# Patient Record
Sex: MALE | Race: WHITE | Employment: UNEMPLOYED | URBAN - METROPOLITAN AREA
[De-identification: names, ages, dates, MRNs, and addresses within clinical notes are randomized per-mention and may not be internally consistent; named-entity substitution may affect disease eponyms.]

---

## 2020-10-17 ENCOUNTER — APPOINTMENT (OUTPATIENT)
Dept: CT IMAGING | Age: 60
DRG: 720 | End: 2020-10-17
Payer: MEDICAID

## 2020-10-17 ENCOUNTER — APPOINTMENT (OUTPATIENT)
Dept: GENERAL RADIOLOGY | Age: 60
DRG: 720 | End: 2020-10-17
Payer: MEDICAID

## 2020-10-17 ENCOUNTER — HOSPITAL ENCOUNTER (INPATIENT)
Age: 60
LOS: 4 days | Discharge: HOME | DRG: 720 | End: 2020-10-21
Attending: EMERGENCY MEDICINE | Admitting: INTERNAL MEDICINE
Payer: MEDICAID

## 2020-10-17 PROBLEM — J18.9 MULTIFOCAL PNEUMONIA: Status: ACTIVE | Noted: 2020-10-17

## 2020-10-17 LAB
A/G RATIO: 0.9 (ref 1.1–2.2)
ABO/RH: NORMAL
ACETAMINOPHEN LEVEL: <5 UG/ML (ref 10–30)
ALBUMIN SERPL-MCNC: 3.8 G/DL (ref 3.4–5)
ALP BLD-CCNC: 88 U/L (ref 40–129)
ALT SERPL-CCNC: 21 U/L (ref 10–40)
AMPHETAMINE SCREEN, URINE: NORMAL
ANION GAP SERPL CALCULATED.3IONS-SCNC: 14 MMOL/L (ref 3–16)
ANTIBODY SCREEN: NORMAL
APTT: 26.2 SEC (ref 24.2–36.2)
AST SERPL-CCNC: 27 U/L (ref 15–37)
BARBITURATE SCREEN URINE: NORMAL
BASOPHILS ABSOLUTE: 0 K/UL (ref 0–0.2)
BASOPHILS RELATIVE PERCENT: 0.3 %
BENZODIAZEPINE SCREEN, URINE: NORMAL
BILIRUB SERPL-MCNC: 1.2 MG/DL (ref 0–1)
BILIRUBIN URINE: ABNORMAL
BLOOD, URINE: NEGATIVE
BUN BLDV-MCNC: 30 MG/DL (ref 7–20)
CALCIUM SERPL-MCNC: 9.2 MG/DL (ref 8.3–10.6)
CANNABINOID SCREEN URINE: NORMAL
CHLORIDE BLD-SCNC: 106 MMOL/L (ref 99–110)
CLARITY: ABNORMAL
CO2: 28 MMOL/L (ref 21–32)
COCAINE METABOLITE SCREEN URINE: NORMAL
COLOR: ABNORMAL
CREAT SERPL-MCNC: 1 MG/DL (ref 0.8–1.3)
EOSINOPHILS ABSOLUTE: 0 K/UL (ref 0–0.6)
EOSINOPHILS RELATIVE PERCENT: 0.4 %
EPITHELIAL CELLS, UA: 3 /HPF (ref 0–5)
ETHANOL: NORMAL MG/DL (ref 0–0.08)
GFR AFRICAN AMERICAN: >60
GFR NON-AFRICAN AMERICAN: >60
GLOBULIN: 4.2 G/DL
GLUCOSE BLD-MCNC: 137 MG/DL (ref 70–99)
GLUCOSE URINE: NEGATIVE MG/DL
HCT VFR BLD CALC: 45.7 % (ref 40.5–52.5)
HEMOGLOBIN: 15 G/DL (ref 13.5–17.5)
HYALINE CASTS: 6 /LPF (ref 0–8)
INR BLD: 1.16 (ref 0.86–1.14)
KETONES, URINE: ABNORMAL MG/DL
LACTATE DEHYDROGENASE: 431 U/L (ref 100–190)
LACTIC ACID, SEPSIS: 1.4 MMOL/L (ref 0.4–1.9)
LACTIC ACID, SEPSIS: 2.1 MMOL/L (ref 0.4–1.9)
LEUKOCYTE ESTERASE, URINE: ABNORMAL
LIPASE: 147 U/L (ref 13–60)
LYMPHOCYTES ABSOLUTE: 1.1 K/UL (ref 1–5.1)
LYMPHOCYTES RELATIVE PERCENT: 9.4 %
Lab: NORMAL
MCH RBC QN AUTO: 32 PG (ref 26–34)
MCHC RBC AUTO-ENTMCNC: 32.9 G/DL (ref 31–36)
MCV RBC AUTO: 97.2 FL (ref 80–100)
METHADONE SCREEN, URINE: NORMAL
MICROSCOPIC EXAMINATION: YES
MONOCYTES ABSOLUTE: 1.1 K/UL (ref 0–1.3)
MONOCYTES RELATIVE PERCENT: 9.2 %
NEUTROPHILS ABSOLUTE: 9.6 K/UL (ref 1.7–7.7)
NEUTROPHILS RELATIVE PERCENT: 80.7 %
NITRITE, URINE: NEGATIVE
OPIATE SCREEN URINE: NORMAL
OXYCODONE URINE: NORMAL
PDW BLD-RTO: 14 % (ref 12.4–15.4)
PH UA: 5
PH UA: 5 (ref 5–8)
PHENCYCLIDINE SCREEN URINE: NORMAL
PLATELET # BLD: 260 K/UL (ref 135–450)
PMV BLD AUTO: 10.4 FL (ref 5–10.5)
POTASSIUM SERPL-SCNC: 3.9 MMOL/L (ref 3.5–5.1)
PRO-BNP: 93 PG/ML (ref 0–124)
PROCALCITONIN: 0.49 NG/ML (ref 0–0.15)
PROPOXYPHENE SCREEN: NORMAL
PROTEIN UA: ABNORMAL MG/DL
PROTHROMBIN TIME: 13.5 SEC (ref 10–13.2)
RAPID INFLUENZA  B AGN: NEGATIVE
RAPID INFLUENZA A AGN: NEGATIVE
RBC # BLD: 4.7 M/UL (ref 4.2–5.9)
RBC UA: NORMAL /HPF (ref 0–4)
SALICYLATE, SERUM: <0.3 MG/DL (ref 15–30)
SODIUM BLD-SCNC: 148 MMOL/L (ref 136–145)
SPECIFIC GRAVITY UA: 1.03 (ref 1–1.03)
TOTAL CK: 306 U/L (ref 39–308)
TOTAL PROTEIN: 8 G/DL (ref 6.4–8.2)
TROPONIN: <0.01 NG/ML
URINE REFLEX TO CULTURE: ABNORMAL
URINE TYPE: ABNORMAL
UROBILINOGEN, URINE: 2 E.U./DL
WBC # BLD: 11.8 K/UL (ref 4–11)
WBC UA: 4 /HPF (ref 0–5)

## 2020-10-17 PROCEDURE — 99285 EMERGENCY DEPT VISIT HI MDM: CPT

## 2020-10-17 PROCEDURE — 6360000002 HC RX W HCPCS: Performed by: PHYSICIAN ASSISTANT

## 2020-10-17 PROCEDURE — 87804 INFLUENZA ASSAY W/OPTIC: CPT

## 2020-10-17 PROCEDURE — 2580000003 HC RX 258: Performed by: PHYSICIAN ASSISTANT

## 2020-10-17 PROCEDURE — 72125 CT NECK SPINE W/O DYE: CPT

## 2020-10-17 PROCEDURE — G0480 DRUG TEST DEF 1-7 CLASSES: HCPCS

## 2020-10-17 PROCEDURE — 80053 COMPREHEN METABOLIC PANEL: CPT

## 2020-10-17 PROCEDURE — 93005 ELECTROCARDIOGRAM TRACING: CPT | Performed by: EMERGENCY MEDICINE

## 2020-10-17 PROCEDURE — 86900 BLOOD TYPING SEROLOGIC ABO: CPT

## 2020-10-17 PROCEDURE — 74177 CT ABD & PELVIS W/CONTRAST: CPT

## 2020-10-17 PROCEDURE — 83880 ASSAY OF NATRIURETIC PEPTIDE: CPT

## 2020-10-17 PROCEDURE — 83690 ASSAY OF LIPASE: CPT

## 2020-10-17 PROCEDURE — 85025 COMPLETE CBC W/AUTO DIFF WBC: CPT

## 2020-10-17 PROCEDURE — 71260 CT THORAX DX C+: CPT

## 2020-10-17 PROCEDURE — 71045 X-RAY EXAM CHEST 1 VIEW: CPT

## 2020-10-17 PROCEDURE — 36415 COLL VENOUS BLD VENIPUNCTURE: CPT

## 2020-10-17 PROCEDURE — 96375 TX/PRO/DX INJ NEW DRUG ADDON: CPT

## 2020-10-17 PROCEDURE — 85610 PROTHROMBIN TIME: CPT

## 2020-10-17 PROCEDURE — 82550 ASSAY OF CK (CPK): CPT

## 2020-10-17 PROCEDURE — 83615 LACTATE (LD) (LDH) ENZYME: CPT

## 2020-10-17 PROCEDURE — 6360000004 HC RX CONTRAST MEDICATION: Performed by: PHYSICIAN ASSISTANT

## 2020-10-17 PROCEDURE — 81001 URINALYSIS AUTO W/SCOPE: CPT

## 2020-10-17 PROCEDURE — 83605 ASSAY OF LACTIC ACID: CPT

## 2020-10-17 PROCEDURE — 85730 THROMBOPLASTIN TIME PARTIAL: CPT

## 2020-10-17 PROCEDURE — 86850 RBC ANTIBODY SCREEN: CPT

## 2020-10-17 PROCEDURE — 80307 DRUG TEST PRSMV CHEM ANLYZR: CPT

## 2020-10-17 PROCEDURE — 87040 BLOOD CULTURE FOR BACTERIA: CPT

## 2020-10-17 PROCEDURE — 84484 ASSAY OF TROPONIN QUANT: CPT

## 2020-10-17 PROCEDURE — 70450 CT HEAD/BRAIN W/O DYE: CPT

## 2020-10-17 PROCEDURE — 82728 ASSAY OF FERRITIN: CPT

## 2020-10-17 PROCEDURE — 1200000000 HC SEMI PRIVATE

## 2020-10-17 PROCEDURE — 86140 C-REACTIVE PROTEIN: CPT

## 2020-10-17 PROCEDURE — 84145 PROCALCITONIN (PCT): CPT

## 2020-10-17 PROCEDURE — U0003 INFECTIOUS AGENT DETECTION BY NUCLEIC ACID (DNA OR RNA); SEVERE ACUTE RESPIRATORY SYNDROME CORONAVIRUS 2 (SARS-COV-2) (CORONAVIRUS DISEASE [COVID-19]), AMPLIFIED PROBE TECHNIQUE, MAKING USE OF HIGH THROUGHPUT TECHNOLOGIES AS DESCRIBED BY CMS-2020-01-R: HCPCS

## 2020-10-17 PROCEDURE — 96365 THER/PROPH/DIAG IV INF INIT: CPT

## 2020-10-17 PROCEDURE — 86901 BLOOD TYPING SEROLOGIC RH(D): CPT

## 2020-10-17 PROCEDURE — U0002 COVID-19 LAB TEST NON-CDC: HCPCS

## 2020-10-17 RX ORDER — LORAZEPAM 2 MG/ML
1 INJECTION INTRAMUSCULAR ONCE
Status: COMPLETED | OUTPATIENT
Start: 2020-10-17 | End: 2020-10-17

## 2020-10-17 RX ORDER — 0.9 % SODIUM CHLORIDE 0.9 %
1000 INTRAVENOUS SOLUTION INTRAVENOUS ONCE
Status: COMPLETED | OUTPATIENT
Start: 2020-10-17 | End: 2020-10-17

## 2020-10-17 RX ORDER — 0.9 % SODIUM CHLORIDE 0.9 %
30 INTRAVENOUS SOLUTION INTRAVENOUS ONCE
Status: COMPLETED | OUTPATIENT
Start: 2020-10-17 | End: 2020-10-17

## 2020-10-17 RX ADMIN — Medication 1 G: at 17:36

## 2020-10-17 RX ADMIN — AZITHROMYCIN MONOHYDRATE 500 MG: 500 INJECTION, POWDER, LYOPHILIZED, FOR SOLUTION INTRAVENOUS at 17:39

## 2020-10-17 RX ADMIN — LORAZEPAM 1 MG: 2 INJECTION INTRAMUSCULAR; INTRAVENOUS at 17:40

## 2020-10-17 RX ADMIN — IOPAMIDOL 75 ML: 755 INJECTION, SOLUTION INTRAVENOUS at 16:09

## 2020-10-17 RX ADMIN — SODIUM CHLORIDE 2535 ML: 9 INJECTION, SOLUTION INTRAVENOUS at 17:36

## 2020-10-17 RX ADMIN — SODIUM CHLORIDE 1000 ML: 9 INJECTION, SOLUTION INTRAVENOUS at 14:59

## 2020-10-17 ASSESSMENT — ENCOUNTER SYMPTOMS
NAUSEA: 0
RHINORRHEA: 0
ABDOMINAL PAIN: 0
VOMITING: 0
DIARRHEA: 0
COUGH: 1
SHORTNESS OF BREATH: 0

## 2020-10-17 NOTE — ED NOTES
RN applied mepilex to left heal and right stump, left medial foot + skin tear, mepilex border applied, food order per pt request and water given per pt request     Thang Ireland RN  10/17/20 1528
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
## 2020-10-18 LAB
C-REACTIVE PROTEIN: 47.3 MG/L (ref 0–5.1)
D DIMER: 847 NG/ML DDU (ref 0–229)
EKG ATRIAL RATE: 96 BPM
EKG DIAGNOSIS: NORMAL
EKG P AXIS: 53 DEGREES
EKG P-R INTERVAL: 124 MS
EKG Q-T INTERVAL: 378 MS
EKG QRS DURATION: 78 MS
EKG QTC CALCULATION (BAZETT): 477 MS
EKG R AXIS: -43 DEGREES
EKG T AXIS: 38 DEGREES
EKG VENTRICULAR RATE: 96 BPM
FERRITIN: 441.2 NG/ML (ref 30–400)

## 2020-10-18 PROCEDURE — 93010 ELECTROCARDIOGRAM REPORT: CPT | Performed by: INTERNAL MEDICINE

## 2020-10-18 PROCEDURE — 87449 NOS EACH ORGANISM AG IA: CPT

## 2020-10-18 PROCEDURE — 85379 FIBRIN DEGRADATION QUANT: CPT

## 2020-10-18 PROCEDURE — 1200000000 HC SEMI PRIVATE

## 2020-10-18 PROCEDURE — 6370000000 HC RX 637 (ALT 250 FOR IP): Performed by: INTERNAL MEDICINE

## 2020-10-18 PROCEDURE — 2580000003 HC RX 258: Performed by: INTERNAL MEDICINE

## 2020-10-18 PROCEDURE — 6360000002 HC RX W HCPCS: Performed by: INTERNAL MEDICINE

## 2020-10-18 PROCEDURE — 87324 CLOSTRIDIUM AG IA: CPT

## 2020-10-18 PROCEDURE — 51702 INSERT TEMP BLADDER CATH: CPT

## 2020-10-18 RX ORDER — ACETAMINOPHEN 650 MG/1
650 SUPPOSITORY RECTAL EVERY 6 HOURS PRN
Status: DISCONTINUED | OUTPATIENT
Start: 2020-10-18 | End: 2020-10-21 | Stop reason: HOSPADM

## 2020-10-18 RX ORDER — DEXAMETHASONE SODIUM PHOSPHATE 10 MG/ML
6 INJECTION, SOLUTION INTRAMUSCULAR; INTRAVENOUS DAILY
Status: DISCONTINUED | OUTPATIENT
Start: 2020-10-18 | End: 2020-10-19

## 2020-10-18 RX ORDER — MAGNESIUM SULFATE IN WATER 40 MG/ML
2 INJECTION, SOLUTION INTRAVENOUS PRN
Status: DISCONTINUED | OUTPATIENT
Start: 2020-10-18 | End: 2020-10-21 | Stop reason: HOSPADM

## 2020-10-18 RX ORDER — ONDANSETRON 2 MG/ML
4 INJECTION INTRAMUSCULAR; INTRAVENOUS EVERY 6 HOURS PRN
Status: DISCONTINUED | OUTPATIENT
Start: 2020-10-18 | End: 2020-10-21 | Stop reason: HOSPADM

## 2020-10-18 RX ORDER — POTASSIUM CHLORIDE 7.45 MG/ML
10 INJECTION INTRAVENOUS PRN
Status: DISCONTINUED | OUTPATIENT
Start: 2020-10-18 | End: 2020-10-21 | Stop reason: HOSPADM

## 2020-10-18 RX ORDER — PROMETHAZINE HYDROCHLORIDE 25 MG/1
12.5 TABLET ORAL EVERY 6 HOURS PRN
Status: DISCONTINUED | OUTPATIENT
Start: 2020-10-18 | End: 2020-10-21 | Stop reason: HOSPADM

## 2020-10-18 RX ORDER — ACETAMINOPHEN 325 MG/1
650 TABLET ORAL EVERY 6 HOURS PRN
Status: DISCONTINUED | OUTPATIENT
Start: 2020-10-18 | End: 2020-10-21 | Stop reason: HOSPADM

## 2020-10-18 RX ORDER — SODIUM CHLORIDE 0.9 % (FLUSH) 0.9 %
10 SYRINGE (ML) INJECTION PRN
Status: DISCONTINUED | OUTPATIENT
Start: 2020-10-18 | End: 2020-10-21 | Stop reason: HOSPADM

## 2020-10-18 RX ORDER — SODIUM CHLORIDE 0.9 % (FLUSH) 0.9 %
10 SYRINGE (ML) INJECTION EVERY 12 HOURS SCHEDULED
Status: DISCONTINUED | OUTPATIENT
Start: 2020-10-18 | End: 2020-10-21 | Stop reason: HOSPADM

## 2020-10-18 RX ADMIN — AZITHROMYCIN MONOHYDRATE 500 MG: 500 INJECTION, POWDER, LYOPHILIZED, FOR SOLUTION INTRAVENOUS at 17:37

## 2020-10-18 RX ADMIN — ENOXAPARIN SODIUM 40 MG: 40 INJECTION SUBCUTANEOUS at 10:03

## 2020-10-18 RX ADMIN — DEXAMETHASONE SODIUM PHOSPHATE 6 MG: 10 INJECTION, SOLUTION INTRAMUSCULAR; INTRAVENOUS at 10:03

## 2020-10-18 RX ADMIN — Medication 10 ML: at 22:35

## 2020-10-18 RX ADMIN — Medication 1 G: at 17:37

## 2020-10-18 RX ADMIN — ACETAMINOPHEN 650 MG: 325 TABLET ORAL at 10:12

## 2020-10-18 RX ADMIN — Medication 10 ML: at 10:03

## 2020-10-18 ASSESSMENT — PAIN SCALES - GENERAL
PAINLEVEL_OUTOF10: 0
PAINLEVEL_OUTOF10: 3
PAINLEVEL_OUTOF10: 0
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
## 2020-10-18 NOTE — PROGRESS NOTES
Assessment complete. Oriented x4. Denies pain. Respirations regular and unlabored. Breath sounds diminished. On 1 L O2. NSR on tele. Right BKA. Wounds to buttocks and left foot. Estes in place. Patient very lethargic. Unable to answer admission questions at this time. Did tell RN that he doesn't take any medications at home. Patient encouraged to use call light with any needs. Patient states understanding, call light in reach, bed alarm on.

## 2020-10-18 NOTE — ED PROVIDER NOTES
I independently performed a history and physical on Palmer Epley. All diagnostic, treatment, and disposition decisions were made by myself in conjunction with the advanced practice provider. I have participated in the medical decision making and directed the treatment plan and disposition of the patient. For further details of [facility] emergency department encounter, please see the advanced practice provider's documentation. CHIEF COMPLAINT  Chief Complaint   Patient presents with    Fatigue     IN VIA FF MEDIC FROM HOME. NEIGHBORS CALLED AFTER PT HAD A FALL OUTSIDE OF HIS TRAILER/HOME. SICK FOV OVER A WEEK. PT'S RIGHT LEG IS A PROSTHETIC. LLE COOL/CLAMMY     Briefly, Palmer Epley is a 56 y.o. male  who presents to the ED complaining of fall, pt is disheveled and poor historian and not providing much history. Has a remote history of RLE BKA. He has cough, malaise, fatigue. Has denies CP or diarrhea or vomiting or abdominal pains to me. He said not SOB to PA but admits some SOB with exertion to me. C/o malaise. FOCUSED PHYSICAL EXAMINATION  BP (!) 110/59   Pulse 80   Temp 97.7 °F (36.5 °C) (Oral)   Resp 21   Ht 6' 3\" (1.905 m)   Wt 234 lb (106.1 kg)   SpO2 93%   BMI 29.25 kg/m²    Focused physical examination notable for disheveled appearance, RLE BKA noted. Rhonchi throughout, no resp distress though, RRR, abd soft NTND. Dry MM.     The 12 lead EKG was interpreted by me as follows:  Rate: normal with a rate of 96  Rhythm: sinus  Axis: left deviation  Intervals: normal IL, narrow QRS, normal QTc  ST segments: no ST elevations or depressions  T waves: no abnormal inversions  Non-specific T wave changes: not present  Prior EKG comparison: No prior is currently available for comparison    MDM:  Diagnostic considerations included acute coronary syndrome, pulmonary embolism, COPD/asthma, pneumonia, sepsis, pericardial tamponade, pneumothorax, CHF, thoracic aortic dissection, anxiety    ED course was notable for concern for sepsis with pneumonia, multifocal.  COVID pending. Patient was given community-acquired coverage antibiotics and will be admitted for ongoing management and evaluation. During every aspect of this patient encounter, full droplet plus PPE precautions were used by myself. During the patient's ED course, the patient was given:  Medications   0.9 % sodium chloride bolus (0 mLs Intravenous Stopped 10/17/20 1725)   LORazepam (ATIVAN) injection 1 mg (1 mg Intravenous Given 10/17/20 1740)   iopamidol (ISOVUE-370) 76 % injection 75 mL (75 mLs Intravenous Given 10/17/20 1609)   0.9 % sodium chloride bolus (2,535 mLs Intravenous New Bag 10/17/20 1736)   cefTRIAXone (ROCEPHIN) 1 g in sterile water 10 mL IV syringe (1 g Intravenous Given 10/17/20 1736)   azithromycin (ZITHROMAX) 500 mg in D5W 250ml Vial Mate (500 mg Intravenous New Bag 10/17/20 1739)        CLINICAL IMPRESSION  1. Septicemia ([address]    2. Pneumonia due to organism    3. Below knee amputation ([address]    4. Hypernatremia    5. Serum lipase elevation        DISPOSITION  Robb Hussein was admitted in fair condition. The plan is to admit to the hospital at this time under the hospitalist service. Dr. Freedom Pickens accepted the patient and will take over the patient's care. The total critical care time spent while evaluating and treating this patient was at least 34 minutes. This excludes time spent doing separately billable procedures. This includes time at the bedside, data interpretation, medication management, obtaining critical history from collateral sources if the patient is unable to provide it directly, and physician consultation. Specifics of interventions taken and potentially life-threatening diagnostic considerations are listed above in the medical decision making. This chart was created using Dragon dictation software.   Efforts were made by me to ensure accuracy, however some errors may be present due to limitations of this technology.             Digna Anne MD  10/17/20 [id number]

## 2020-10-18 NOTE — PROGRESS NOTES
Assessment completed. See flow chart, no medication ordered at this time. Pt in bed resting with eyes closed. Pt put on 1L of O2 for comfort. All needs have been met. Patient encouraged to use call light with any needs. Patient states understanding, call light in reach, bed alarm on. Will continue to monitor.
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
## 2020-10-18 NOTE — PROGRESS NOTES
H/p dictation id [id number]. Date of service 10/18/20. Bilateral atypical pneumonia. covid pending. Tobacco dependence.

## 2020-10-18 NOTE — PLAN OF CARE
Problem: Falls - Risk of:  Goal: Will remain free from falls  Description: Will remain free from falls  Outcome: Ongoing  Note: High fall risk. Patient remains free from falls. Patient encouraged to use call light with any needs. Patient states understanding, call light in reach, bed alarm on. Problem: Skin Integrity:  Goal: Absence of new skin breakdown  Description: Absence of new skin breakdown  Outcome: Ongoing  Note: Multiple wounds to buttocks and left foot. Dressings in place. Frequent repositioning. Will continue to monitor. Problem: Gas Exchange - Impaired:  Goal: Levels of oxygenation will improve  Description: Levels of oxygenation will improve  Outcome: Ongoing  Note: On 1 L O2. O2 saturation remains >90%. Will wean as able.

## 2020-10-19 LAB
ANION GAP SERPL CALCULATED.3IONS-SCNC: 10 MMOL/L (ref 3–16)
BUN BLDV-MCNC: 13 MG/DL (ref 7–20)
C DIFF TOXIN/ANTIGEN: NORMAL
CALCIUM SERPL-MCNC: 8.3 MG/DL (ref 8.3–10.6)
CHLORIDE BLD-SCNC: 106 MMOL/L (ref 99–110)
CO2: 24 MMOL/L (ref 21–32)
CREAT SERPL-MCNC: 0.8 MG/DL (ref 0.8–1.3)
GFR AFRICAN AMERICAN: >60
GFR NON-AFRICAN AMERICAN: >60
GLUCOSE BLD-MCNC: 183 MG/DL (ref 70–99)
HCT VFR BLD CALC: 40.7 % (ref 40.5–52.5)
HEMOGLOBIN: 13.5 G/DL (ref 13.5–17.5)
MCH RBC QN AUTO: 32.5 PG (ref 26–34)
MCHC RBC AUTO-ENTMCNC: 33.2 G/DL (ref 31–36)
MCV RBC AUTO: 97.8 FL (ref 80–100)
PDW BLD-RTO: 14 % (ref 12.4–15.4)
PLATELET # BLD: 259 K/UL (ref 135–450)
PMV BLD AUTO: 9.4 FL (ref 5–10.5)
POTASSIUM SERPL-SCNC: 4 MMOL/L (ref 3.5–5.1)
RBC # BLD: 4.16 M/UL (ref 4.2–5.9)
SARS-COV-2, PCR: NOT DETECTED
SODIUM BLD-SCNC: 140 MMOL/L (ref 136–145)
WBC # BLD: 6.6 K/UL (ref 4–11)

## 2020-10-19 PROCEDURE — 2580000003 HC RX 258: Performed by: INTERNAL MEDICINE

## 2020-10-19 PROCEDURE — 85027 COMPLETE CBC AUTOMATED: CPT

## 2020-10-19 PROCEDURE — 6360000002 HC RX W HCPCS: Performed by: INTERNAL MEDICINE

## 2020-10-19 PROCEDURE — 80048 BASIC METABOLIC PNL TOTAL CA: CPT

## 2020-10-19 PROCEDURE — 1200000000 HC SEMI PRIVATE

## 2020-10-19 PROCEDURE — 36415 COLL VENOUS BLD VENIPUNCTURE: CPT

## 2020-10-19 RX ADMIN — Medication 1 G: at 17:59

## 2020-10-19 RX ADMIN — Medication 10 ML: at 09:15

## 2020-10-19 RX ADMIN — Medication 10 ML: at 21:00

## 2020-10-19 RX ADMIN — AZITHROMYCIN MONOHYDRATE 500 MG: 500 INJECTION, POWDER, LYOPHILIZED, FOR SOLUTION INTRAVENOUS at 17:59

## 2020-10-19 RX ADMIN — DEXAMETHASONE SODIUM PHOSPHATE 6 MG: 10 INJECTION, SOLUTION INTRAMUSCULAR; INTRAVENOUS at 09:14

## 2020-10-19 RX ADMIN — ENOXAPARIN SODIUM 40 MG: 40 INJECTION SUBCUTANEOUS at 09:14

## 2020-10-19 ASSESSMENT — PAIN SCALES - GENERAL
PAINLEVEL_OUTOF10: 0

## 2020-10-19 NOTE — PROGRESS NOTES
Pt transferred to room 3312 from [facility]. VSS. Oriented pt to room and call light. Call light and bedside table within reach. Instructed to call out with any needs, v/u. Will monitor.

## 2020-10-19 NOTE — CARE COORDINATION
Discharge Planning Assessment  Discharge Planning Assessment  RN/SW discharge planner met with patient/ (and family member) to discuss reason for admission, current living situation, and potential needs at the time of discharge    Demographics/Insurance verified Yes homeless    Current type of dwelling: patient is homeless    Patient from ECF/SW confirmed with: n/a    Living arrangements: \"I'll figure it out\"    Level of function/Support: able to care for self    PCP: none    Last Visit to PCP: refuses    DME: prosthetic leg with patient    Active with any community resources/agencies/skilled home care:  None    Medication compliance issues: not on any meds    Financial issues that could impact healthcare: patient is homeless, states he has [email or web address] states he would like a list of homeless shelters      Tentative discharge plan: \"I'll figure it out\"    Discussed and provided facilities of choice if transition to a skilled nursing facility is required at the time of discharge      Discussed with patient and/or family that on the day of discharge home tentative time of discharge will be between 10 AM and noon.     Transportation at the time of discharge: \"I got friends\"    KAREN Zelaya, CCM, RN  [facility]  [phone]

## 2020-10-19 NOTE — PROGRESS NOTES
Assessment completed. Respirations even and easy. Lungs clear and diminished. On RA. Denies SOB. Sitting up eating breakfast at this time. Call bell in reach.

## 2020-10-19 NOTE — PROGRESS NOTES
Pt shift assessment completed. Pt is alert and orient * 4. Doesn't appear to be in pain. Pt is non ambulatory with R BKA. Pt follows command. .Pt respiration are regular and unlabored and on room air. Breath sounds diminished. Fluids infusing in right  PIV. Scheduled medications given as ordered. Patient encouraged to use call light with any needs. Patient states understanding, call light in reach, bed alarm on. All safety checks in place and will continue to monitor pt.

## 2020-10-19 NOTE — CARE COORDINATION
Second attempt made to contact patient via phone, h is not answering.     Contreras Schwartz, ASHLEYN, CCM, RN  [facility]  [phone]
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
## 2020-10-20 PROCEDURE — 97530 THERAPEUTIC ACTIVITIES: CPT

## 2020-10-20 PROCEDURE — 97535 SELF CARE MNGMENT TRAINING: CPT

## 2020-10-20 PROCEDURE — 2580000003 HC RX 258: Performed by: INTERNAL MEDICINE

## 2020-10-20 PROCEDURE — 97116 GAIT TRAINING THERAPY: CPT

## 2020-10-20 PROCEDURE — 6360000002 HC RX W HCPCS: Performed by: INTERNAL MEDICINE

## 2020-10-20 PROCEDURE — 97165 OT EVAL LOW COMPLEX 30 MIN: CPT

## 2020-10-20 PROCEDURE — 1200000000 HC SEMI PRIVATE

## 2020-10-20 PROCEDURE — 97163 PT EVAL HIGH COMPLEX 45 MIN: CPT

## 2020-10-20 RX ADMIN — ENOXAPARIN SODIUM 40 MG: 40 INJECTION SUBCUTANEOUS at 09:34

## 2020-10-20 RX ADMIN — AZITHROMYCIN MONOHYDRATE 500 MG: 500 INJECTION, POWDER, LYOPHILIZED, FOR SOLUTION INTRAVENOUS at 16:39

## 2020-10-20 RX ADMIN — Medication 10 ML: at 09:34

## 2020-10-20 RX ADMIN — Medication 1 G: at 16:37

## 2020-10-20 ASSESSMENT — PAIN SCALES - GENERAL
PAINLEVEL_OUTOF10: 0

## 2020-10-20 NOTE — PLAN OF CARE
Problem: Falls - Risk of:  Goal: Will remain free from falls  Description: Will remain free from falls  Outcome: Ongoing  Note: Pt is wearing the fall bracelet, S.A.F.E. sign is posted outside door, and yellow blanket is on the bed. Pt informed of fall risks, verbalizes understanding, and agrees to ask for help to ambulate. Will monitor.       Problem: Skin Integrity:  Goal: Absence of new skin breakdown  Description: Absence of new skin breakdown  Outcome: Ongoing     Problem: Gas Exchange - Impaired:  Goal: Levels of oxygenation will improve  Description: Levels of oxygenation will improve  Outcome: Ongoing

## 2020-10-20 NOTE — PROGRESS NOTES
[facility] PROGRESS NOTE    10/19/20      Name: Karine Palacios . Admitted: 10/17/2020  Primary Care Provider: Referring Not In System (Inactive) (Tel: None)      Subjective: Lefty Choe Pt reports feeling better  Still weak  + cough  No fever, breathing on RA  Denies pain tolerating diet    Reviewed interval ancillary notes    Current Medications  mineral oil-hydrophilic petrolatum (AQUAPHOR) ointment, BID PRN  sodium chloride flush 0.9 % injection 10 mL, 2 times per day  sodium chloride flush 0.9 % injection 10 mL, PRN  acetaminophen (TYLENOL) tablet 650 mg, Q6H PRN    Or  acetaminophen (TYLENOL) suppository 650 mg, Q6H PRN  promethazine (PHENERGAN) tablet 12.5 mg, Q6H PRN    Or  ondansetron (ZOFRAN) injection 4 mg, Q6H PRN  enoxaparin (LOVENOX) injection 40 mg, Daily  potassium chloride 10 mEq/100 mL IVPB (Peripheral Line), PRN  magnesium sulfate 2 g in 50 mL IVPB premix, PRN  azithromycin (ZITHROMAX) 500 mg in D5W 250ml Vial Mate, Q24H  cefTRIAXone (ROCEPHIN) 1 g in sterile water 10 mL IV syringe, Q24H        Objective:  /76   Pulse 67   Temp 98 °F (36.7 °C) (Oral)   Resp 18   Ht 6' 3\" (1.905 m)   Wt 222 lb 8 oz (100.9 kg)   SpO2 94%   BMI 27.81 kg/m²     Intake/Output Summary (Last 24 hours) at 10/20/2020 0516  Last data filed at 10/19/2020 2339  Gross per 24 hour   Intake 480 ml   Output 1725 ml   Net -1245 ml      Wt Readings from Last 3 Encounters:   10/20/20 222 lb 8 oz (100.9 kg)       General appearance:  Appears comfortable  Eyes: Sclera clear. Pupils equal.  ENT: Moist oral mucosa. Trachea midline, no adenopathy. Cardiovascular: Regular rhythm, normal S1, S2. No murmur. No edema in lower extremities  Respiratory: Not using accessory muscles. Good inspiratory effort. Clear to auscultation bilaterally, no wheeze or crackles.    GI: Abdomen soft, no tenderness, not distended, normal bowel sounds  Musculoskeletal: No cyanosis in digits, neck supple  Neurology: CN 2-12 grossly intact. No speech or motor deficits  Psych: Normal affect. Alert and oriented in time, place and person  Skin: Warm, dry, normal turgor    Labs and Tests:  CBC:   Recent Labs     10/17/20  1430 10/19/20  1429   WBC 11.8* 6.6   HGB 15.0 13.5    259     BMP:    Recent Labs     10/17/20  1430 10/19/20  1429   * 140   K 3.9 4.0    106   CO2 28 24   BUN 30* 13   CREATININE 1.0 0.8   GLUCOSE 137* 183*     Hepatic:   Recent Labs     10/17/20  1430   AST 27   ALT 21   BILITOT 1.2*   ALKPHOS 88         Problem List  Active Problems:    Multifocal pneumonia  Resolved Problems:    * No resolved hospital problems. *       Assessment & Plan:   1. Pneumonia  Chest CT showed b/l multifocal pneumonia  COVID  And influenza neg  resp and blood cultures showed no growth to date  Cont IV abx  WBC trending down    Hypernatremia na 148  Resolved with fluid resuscitation    dispo the pt is home less.  Even though he is not forthcoming with this information sw on board for dc planning        Diet: DIET GENERAL;  Code:Full Code  DVT PPX      Baldomero Torre MD   [phone]

## 2020-10-20 NOTE — PLAN OF CARE
Problem: Fluid Volume - Deficit:  Goal: Achieves intake and output within specified parameters  Description: Achieves intake and output within specified parameters  Outcome: Ongoing  Note: Pt requesting irizarry catheter be removed. No indication for irizarry need at this time. Discussed with Miguelito Hobson, order to remove irizarry. Pt educated on POC, irizarry removed per protocol. Urinal at bedside, pt is alert and able to void per urinal. Will monitor.
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
## 2020-10-20 NOTE — PROGRESS NOTES
Limits  Social/Functional History  Social/Functional History  Lives With: Alone  Type of Home: Trailer  Home Layout: One level  Home Access: Stairs to enter with rails  Entrance Stairs - Number of Steps: 3 DANIEL  Bathroom Shower/Tub: Tub/Shower unit  Bathroom Toilet: Standard  Home Equipment: Quad cane, Standard walker(Pt stated he has 3 walkers)  ADL Assistance: Independent  Homemaking Assistance: Independent  Homemaking Responsibilities: Yes  Ambulation Assistance: Independent  Transfer Assistance: Independent  Active : No  Additional Comments: Pt denies other falls in past 6 mo. Pt amb without AD normally. Pt has R BKA and prothesis in poor condition (velcro worn out, stump /sock shreded) and needing repair badly. Cognition        Objective     Observation/Palpation  Posture: Fair(flexed trunk)  Observation: sores L foot and R stump. Edema: R LE swollen, mod pitting edema    AROM RLE (degrees)  RLE AROM: WFL  AROM LLE (degrees)  LLE AROM : WFL  Strength RLE  Comment: grossly -4/5  Strength LLE  Comment: grossly -4/5     Sensation  Overall Sensation Status: Impaired  Bed mobility  Supine to Sit: Modified independent  Scooting: Modified independent  Comment: Pt left seated in chair. Transfers  Sit to Stand: Stand by assistance(from EOB and toilet with BSC over the top.)  Stand to sit: Stand by assistance  Comment: Pt sat EOB 5 min. with good balance and donned prothesis independently. Pt attempted to stand from bed with no walker and unable to keep his balance sitting back on bed. RW put in front of pt and able to stand maintaining balance. Ambulation  Ambulation?: Yes  Ambulation 1  Surface: level tile  Device: Rolling Walker  Other Apparatus: (R LE prothesis)  Assistance: Stand by assistance  Quality of Gait: step-to gait pattern, prothesis poorly fitting. Gait Deviations: Slow Nohemy  Distance: Pt amb with RW and SBA for 15 ft x 2. Comments: Pt amb to toilet and sat to have BM.  Pt then able to clean himself with set up assist and gown changed. Pt given assist with washing his back, he did the rest of his body. Pt amb back to bedside chair. Stairs/Curb  Stairs?: No     Balance  Posture: Good  Sitting - Static: Good  Sitting - Dynamic: Good  Standing - Static: Fair;+(with RW)  Standing - Dynamic: Fair(with RW)  Comments: Poor fitting prothesis making standing balance difficult. Plan   Plan  Times per week: No further PT recommended at this time. Safety Devices  Type of devices: Call light within reach, Chair alarm in place, Gait belt, Patient at risk for falls, Left in chair, Nurse notified, All fall risk precautions in place    G-Code       OutComes Score                                                  AM-PAC Score  AM-PAC Inpatient Mobility Raw Score : 19 (10/20/20 1514)  AM-PAC Inpatient T-Scale Score : 45.44 (10/20/20 1514)  Mobility Inpatient CMS 0-100% Score: 41.77 (10/20/20 1514)  Mobility Inpatient CMS G-Code Modifier : CK (10/20/20 1514)          Goals  Short term goals  Short term goal 1: No acute PT goals at this time. Patient Goals   Patient goals : Did not state.        Therapy Time   Individual Concurrent Group Co-treatment   Time In 1342         Time Out 1430         Minutes 48         Timed Code Treatment Minutes: Akira 71, [id number]

## 2020-10-20 NOTE — PROGRESS NOTES
[facility]    10/20/2020 11:06 AM        Name: Maynor Mansfield . Admitted: 10/17/2020  Primary Care Provider: Referring Not In System (Inactive) (Tel: None)      Chief Complaint   Patient presents with    Fatigue     IN VIA FF MEDIC FROM HOME. NEIGHBORS CALLED AFTER PT HAD A FALL OUTSIDE OF HIS TRAILER/HOME. SICK FOV OVER A WEEK. PT'S RIGHT LEG IS A PROSTHETIC. LLE COOL/CLAMMY       Brief History: Patient is a 55 yo male who has not seen physician for years, on no meds at home. He presented to hospital with fatigue, shortness of breath, cough. He had fallen outside his trailer. CT scan chest showed bilateral pneumonia. Subjective:  Presently resting in bed. Reports he is feeling better compared to admission, still weak. Offers no complaints, denies shortness of breath, chest pain.     Reviewed interval ancillary notes    Current Medications  mineral oil-hydrophilic petrolatum (AQUAPHOR) ointment, BID PRN  sodium chloride flush 0.9 % injection 10 mL, 2 times per day  sodium chloride flush 0.9 % injection 10 mL, PRN  acetaminophen (TYLENOL) tablet 650 mg, Q6H PRN    Or  acetaminophen (TYLENOL) suppository 650 mg, Q6H PRN  promethazine (PHENERGAN) tablet 12.5 mg, Q6H PRN    Or  ondansetron (ZOFRAN) injection 4 mg, Q6H PRN  enoxaparin (LOVENOX) injection 40 mg, Daily  potassium chloride 10 mEq/100 mL IVPB (Peripheral Line), PRN  magnesium sulfate 2 g in 50 mL IVPB premix, PRN  azithromycin (ZITHROMAX) 500 mg in D5W 250ml Vial Mate, Q24H  cefTRIAXone (ROCEPHIN) 1 g in sterile water 10 mL IV syringe, Q24H        Objective:  /76   Pulse 67   Temp 98 °F (36.7 °C) (Oral)   Resp 18   Ht 6' 3\" (1.905 m)   Wt 222 lb 8 oz (100.9 kg)   SpO2 94%   BMI 27.81 kg/m²     Intake/Output Summary (Last 24 hours) at 10/20/2020 0646  Last data filed at 10/19/2020 7201  Gross per 24 hour   Intake 480 ml   Output 1725 ml   Net -1245 ml      Wt Readings from Last 3 Encounters:   10/20/20 222 lb 8 oz (100.9 kg)       General appearance:  Appears comfortable  Eyes: Sclera clear. Pupils equal.  ENT: Moist oral mucosa. Trachea midline, no adenopathy. Cardiovascular: Regular rhythm, normal S1, S2. No murmur. No edema in lower extremities  Respiratory: Not using accessory muscles. Good inspiratory effort. Clear to auscultation bilaterally, no wheeze or crackles. GI: Abdomen soft, no tenderness, not distended, normal bowel sounds  Musculoskeletal: No cyanosis in digits, neck supple  Neurology: CN 2-12 grossly intact. No speech or motor deficits  Psych: Normal affect. Alert and oriented in time, place and person  Skin: Warm, dry, normal turgor    Labs and Tests:  CBC:   Recent Labs     10/17/20  1430 10/19/20  1429   WBC 11.8* 6.6   HGB 15.0 13.5    259     BMP:    Recent Labs     10/17/20  1430 10/19/20  1429   * 140   K 3.9 4.0    106   CO2 28 24   BUN 30* 13   CREATININE 1.0 0.8   GLUCOSE 137* 183*     Hepatic:   Recent Labs     10/17/20  1430   AST 27   ALT 21   BILITOT 1.2*   ALKPHOS 88     CXR 10/17/2020:  FINDINGS:    The cardial-pericardial silhouette is unremarkable in appearance. The lungs    are clear. No pneumothorax is found. No free air is seen. No acute bony    abnormality. CT Head/Cervical Spine 10/17/2020:  No acute intracranial abnormality.  Mild cerebral atrophy appropriate for age.         Early degenerative changes of the cervical spine.  No acute traumatic    abnormality. CT Abdomen/Pelvis 10/17/2020:  Bibasilar pulmonary infiltrates.  Correlate for pneumonia.         Thickening and edema of the lower esophagus which may reflect esophagitis.         Probable right adrenal adenoma.         Atrophic left adrenal gland.         Sludge or tiny stones in the gallbladder.       CT Chest 10/17/2020:  No acute posttraumatic abnormality visualized.         Infiltrates within both lung bases which could reflect pneumonia.         Small amount of layering debris within the distal trachea extending into both    central bronchi, likely reflecting pooled secretion.  Recommend chest    physiotherapy.         Mild thickening and edema of the lower esophagus which may reflect    esophagitis.         Pulmonary emphysema. Problem List  Active Problems:    Multifocal pneumonia  Resolved Problems:    * No resolved hospital problems. *       Assessment & Plan:   1. Pneumonia. CT scan showed bilateral infiltrates in bases. COVID-19 PCR and influenza screens negative. Procalcitonin 0.49, WBC 11.8->6.6. Afebrile. Continue azithromycin and ceftriaxone. CBC in am. O2 sats mid 90s on room air. 2. Sepsis. Present on admission based on pneumonia, tachypnea (RR 27). , tachycardia (HR 96), lactic acid 2.1->1.4. Received IV fluids. 3. Hypernatremia. Sodium 148 on admission. Improved with hydration. BMP in am.  4. Weakness. Secondary to cute illness. Consult PT/OT.       Diet: DIET GENERAL;  Code:Full Code  DVT PPX: enoxaparin      AYESHA Perez - ZEINA   10/20/2020 11:06 AM
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
## 2020-10-21 VITALS
BODY MASS INDEX: 27.66 KG/M2 | DIASTOLIC BLOOD PRESSURE: 73 MMHG | TEMPERATURE: 98 F | WEIGHT: 222.5 LBS | SYSTOLIC BLOOD PRESSURE: 132 MMHG | HEIGHT: 75 IN | HEART RATE: 75 BPM | OXYGEN SATURATION: 91 % | RESPIRATION RATE: 16 BRPM

## 2020-10-21 LAB
ANION GAP SERPL CALCULATED.3IONS-SCNC: 6 MMOL/L (ref 3–16)
BLOOD CULTURE, ROUTINE: NORMAL
BUN BLDV-MCNC: 12 MG/DL (ref 7–20)
CALCIUM SERPL-MCNC: 8.4 MG/DL (ref 8.3–10.6)
CHLORIDE BLD-SCNC: 105 MMOL/L (ref 99–110)
CO2: 27 MMOL/L (ref 21–32)
CREAT SERPL-MCNC: 0.7 MG/DL (ref 0.8–1.3)
CULTURE, BLOOD 2: NORMAL
GFR AFRICAN AMERICAN: >60
GFR NON-AFRICAN AMERICAN: >60
GLUCOSE BLD-MCNC: 102 MG/DL (ref 70–99)
HCT VFR BLD CALC: 37.7 % (ref 40.5–52.5)
HEMOGLOBIN: 12.8 G/DL (ref 13.5–17.5)
MAGNESIUM: 1.9 MG/DL (ref 1.8–2.4)
MCH RBC QN AUTO: 32.6 PG (ref 26–34)
MCHC RBC AUTO-ENTMCNC: 33.8 G/DL (ref 31–36)
MCV RBC AUTO: 96.3 FL (ref 80–100)
PDW BLD-RTO: 13.5 % (ref 12.4–15.4)
PLATELET # BLD: 251 K/UL (ref 135–450)
PMV BLD AUTO: 9.2 FL (ref 5–10.5)
POTASSIUM SERPL-SCNC: 3 MMOL/L (ref 3.5–5.1)
POTASSIUM SERPL-SCNC: 3.5 MMOL/L (ref 3.5–5.1)
RBC # BLD: 3.92 M/UL (ref 4.2–5.9)
SODIUM BLD-SCNC: 138 MMOL/L (ref 136–145)
WBC # BLD: 6.8 K/UL (ref 4–11)

## 2020-10-21 PROCEDURE — 2580000003 HC RX 258: Performed by: INTERNAL MEDICINE

## 2020-10-21 PROCEDURE — 6360000002 HC RX W HCPCS: Performed by: INTERNAL MEDICINE

## 2020-10-21 PROCEDURE — 80048 BASIC METABOLIC PNL TOTAL CA: CPT

## 2020-10-21 PROCEDURE — 36415 COLL VENOUS BLD VENIPUNCTURE: CPT

## 2020-10-21 PROCEDURE — 83735 ASSAY OF MAGNESIUM: CPT

## 2020-10-21 PROCEDURE — 84132 ASSAY OF SERUM POTASSIUM: CPT

## 2020-10-21 PROCEDURE — 85027 COMPLETE CBC AUTOMATED: CPT

## 2020-10-21 PROCEDURE — 6370000000 HC RX 637 (ALT 250 FOR IP): Performed by: NURSE PRACTITIONER

## 2020-10-21 RX ORDER — AZITHROMYCIN 250 MG/1
250 TABLET, FILM COATED ORAL DAILY
Qty: 4 TABLET | Refills: 0 | Status: SHIPPED | OUTPATIENT
Start: 2020-10-21 | End: 2020-10-25

## 2020-10-21 RX ORDER — POTASSIUM CHLORIDE 20 MEQ/1
20 TABLET, EXTENDED RELEASE ORAL ONCE
Status: COMPLETED | OUTPATIENT
Start: 2020-10-21 | End: 2020-10-21

## 2020-10-21 RX ORDER — LACTOBACILLUS RHAMNOSUS GG 10B CELL
1 CAPSULE ORAL 2 TIMES DAILY WITH MEALS
Status: DISCONTINUED | OUTPATIENT
Start: 2020-10-21 | End: 2020-10-21 | Stop reason: HOSPADM

## 2020-10-21 RX ORDER — AZITHROMYCIN 250 MG/1
250 TABLET, FILM COATED ORAL DAILY
Status: DISCONTINUED | OUTPATIENT
Start: 2020-10-21 | End: 2020-10-21 | Stop reason: HOSPADM

## 2020-10-21 RX ORDER — POTASSIUM CHLORIDE 20 MEQ/1
40 TABLET, EXTENDED RELEASE ORAL ONCE
Status: COMPLETED | OUTPATIENT
Start: 2020-10-21 | End: 2020-10-21

## 2020-10-21 RX ORDER — CEFUROXIME AXETIL 250 MG/1
500 TABLET ORAL EVERY 12 HOURS SCHEDULED
Status: DISCONTINUED | OUTPATIENT
Start: 2020-10-21 | End: 2020-10-21 | Stop reason: HOSPADM

## 2020-10-21 RX ORDER — CEFUROXIME AXETIL 500 MG/1
500 TABLET ORAL 2 TIMES DAILY
Qty: 8 TABLET | Refills: 0 | Status: SHIPPED | OUTPATIENT
Start: 2020-10-21 | End: 2020-10-25

## 2020-10-21 RX ORDER — LACTOBACILLUS RHAMNOSUS GG 10B CELL
1 CAPSULE ORAL 2 TIMES DAILY WITH MEALS
Qty: 14 CAPSULE | Refills: 0 | Status: SHIPPED | OUTPATIENT
Start: 2020-10-21 | End: 2020-10-28

## 2020-10-21 RX ADMIN — AZITHROMYCIN MONOHYDRATE 250 MG: 250 TABLET ORAL at 14:37

## 2020-10-21 RX ADMIN — POTASSIUM CHLORIDE 10 MEQ: 7.46 INJECTION, SOLUTION INTRAVENOUS at 12:16

## 2020-10-21 RX ADMIN — POTASSIUM CHLORIDE 20 MEQ: 1500 TABLET, EXTENDED RELEASE ORAL at 17:13

## 2020-10-21 RX ADMIN — POTASSIUM CHLORIDE 10 MEQ: 7.46 INJECTION, SOLUTION INTRAVENOUS at 09:31

## 2020-10-21 RX ADMIN — Medication 10 ML: at 09:31

## 2020-10-21 RX ADMIN — POTASSIUM CHLORIDE 10 MEQ: 7.46 INJECTION, SOLUTION INTRAVENOUS at 10:45

## 2020-10-21 RX ADMIN — ENOXAPARIN SODIUM 40 MG: 40 INJECTION SUBCUTANEOUS at 09:31

## 2020-10-21 RX ADMIN — POTASSIUM CHLORIDE 40 MEQ: 1500 TABLET, EXTENDED RELEASE ORAL at 14:37

## 2020-10-21 ASSESSMENT — PAIN SCALES - GENERAL
PAINLEVEL_OUTOF10: 0

## 2020-10-21 NOTE — DISCHARGE SUMMARY
[facility]    Patient Demographics    Patient. Palmer Epley  Date of Birth. 1960  MRN. [id number]     Primary care provider. Referring Not In System (Inactive)  (Tel: None)    Admit date: 10/17/2020    Discharge date (blank if same as Note Date): Note Date: 10/21/2020     Reason for Hospitalization. Chief Complaint   Patient presents with    Fatigue     IN VIA FF MEDIC FROM HOME. NEIGHBORS CALLED AFTER PT HAD A FALL OUTSIDE OF HIS TRAILER/HOME. SICK FOV OVER A WEEK. PT'S RIGHT LEG IS A PROSTHETIC. LLE COOL/CLAMMY         Significant Findings. Active Problems:    Multifocal pneumonia  Resolved Problems:    * No resolved hospital problems. *       Problems and results from this hospitalization that need follow up. 1. Hypokalemia/hypernatremia. Recommend BMP on follow up. 2. Needs PCP. Given referral list.  3. RLE prothesis. Follow up with [facility]. Significant test results and incidental findings. CXR 10/17/2020:  FINDINGS:    The cardial-pericardial silhouette is unremarkable in appearance. The lungs    are clear. No pneumothorax is found. No free air is seen. No acute bony    abnormality.       CT Head/Cervical Spine 10/17/2020:  No acute intracranial abnormality.  Mild cerebral atrophy appropriate for age.         Early degenerative changes of the cervical spine.  No acute traumatic    abnormality.       CT Abdomen/Pelvis 10/17/2020:  Bibasilar pulmonary infiltrates.  Correlate for pneumonia.         Thickening and edema of the lower esophagus which may reflect esophagitis.         Probable right adrenal adenoma.         Atrophic left adrenal gland.         Sludge or tiny stones in the gallbladder.       CT Chest 10/17/2020:  No acute posttraumatic abnormality visualized.         Infiltrates within both lung bases which could reflect pneumonia.         Small amount of layering debris within the distal trachea extending into both    central bronchi, likely reflecting pooled secretion.  Recommend chest    physiotherapy.         Mild thickening and edema of the lower esophagus which may reflect    esophagitis.         Pulmonary emphysema.           Invasive procedures and treatments. 1. None     Problem-based Hospital Course. Patient is a 57 yo male who has not seen physician for years, on no meds at home. He presented to hospital with fatigue, shortness of breath, cough. He had fallen outside his trailer. CT scan chest showed bilateral pneumonia. COVID-19, PCR (10/17) negative. 1. Pneumonia. CT scan showed bilateral infiltrates in bases. COVID-19 PCR and influenza screens negative. Procalcitonin 0.49, WBC 11.8 on admission. Started on  IV azithromycin and ceftriaxone, transitioned to po azithromycin 250 mg daily and Ceftin 500 mg BID on DC to complete 7 day. Also discharged on lactobacillus. O2 sats stable room air. Received meds to beds. 2. Sepsis. Present on admission based on pneumonia, tachypnea (RR 27). , tachycardia (HR 96), lactic acid 2.1->1.4. Improved with IV fluids. 3. Hypernatremia. Sodium 148 on admission. Believed secondary to acute illness with dehydration. Resolved with IV hydration, 138 today. 4. Weakness. Secondary to acute illness. Evaluated by PT/OT found to be at baseline. Referral to [facility] for prosthetic evaluation. Current prosthetic is functional, will need replaced once insurance improved, (Medicaid pending). Patient has contact information. 5. Hypokalemia. Potassium 3.0 today and replaced. Recheck prior to DC 3.5 and he received additional 20 mEq. To follow with PCP. Consults. IP CONSULT TO HOSPITALIST  INPATIENT CONSULT TO ORTHOTIST/PROSTHETIST    Physical examination on discharge day.    /73   Pulse 75   Temp 98 °F (36.7 °C) (Oral)   Resp 16   Ht 6' 3\" (1.905 m)   Wt 222 lb 8 oz (100.9 kg) SpO2 91%   BMI 27.81 kg/m²   General appearance. Alert. Looks comfortable. HEENT. Sclera clear. Moist mucus membranes. Cardiovascular. Regular rate and rhythm, normal S1, S2. No murmur. Respiratory. Not using accessory muscles. Clear to auscultation bilaterally, no wheeze. Gastrointestinal. Abdomen soft, non-tender, not distended, normal bowel sounds  Neurology. Facial symmetry. No speech deficits. Moving all extremities equally. Extremities. Right BKA, no edema in left lower extremities. Skin. Warm, dry, normal turgor    Condition at time of discharge stable    Medication instructions provided to patient at discharge. Medication List      START taking these medications    azithromycin 250 MG tablet  Commonly known as:  ZITHROMAX  Take 1 tablet by mouth daily for 4 days  Notes to patient:  Antibiotic  Use: treats infections caused by bacteria   Side effects: may cause diarrhea or stomach upset     cefUROXime 500 MG tablet  Commonly known as:  CEFTIN  Take 1 tablet by mouth 2 times daily for 4 days  Notes to patient:  Antibiotic  Use: treats infections caused by bacteria   Side effects: may cause diarrhea or stomach upset     lactobacillus capsule  Take 1 capsule by mouth 2 times daily (with meals) for 7 days  Notes to patient:  Use: helps intestinal bacteria  Side effects: nausea, diarrhea     mineral oil-hydrophilic petrolatum ointment  Apply topically as needed. Notes to patient:  Apply to toe wound and sacrum           Where to Get Your Medications      These medications were sent to [facility], [address] [place] [id number]    Phone:  [phone]   · azithromycin 250 MG tablet  · cefUROXime 500 MG tablet  · lactobacillus capsule  · mineral oil-hydrophilic petrolatum ointment         Discharge recommendations given to patient. Follow Up.  Establish and follow up with PCP in 1-2 weeks, given referral list

## 2020-10-21 NOTE — PLAN OF CARE
Problem: Falls - Risk of:  Goal: Will remain free from falls  Description: Will remain free from falls  Outcome: Met This Shift  Note: Pt is wearing yellow nonskid socks. Bed is in lowest position, locked, side rails up 2/4, and call light is within reach. Pt informed of fall risks, verbalizes understanding, and agrees to ask for help to ambulate. Will monitor. Problem: Skin Integrity:  Goal: Absence of new skin breakdown  Description: Absence of new skin breakdown  Outcome: Met This Shift  Note: No breakdown noted on this shift. Pt repositions self in bed frequently. Pt continent and calls appropriately. Will monitor.

## 2020-10-21 NOTE — PROGRESS NOTES
[facility]    10/21/2020 1:25 PM        Name: Amie Vidales . Admitted: 10/17/2020  Primary Care Provider: Referring Not In System (Inactive) (Tel: None)      Chief Complaint   Patient presents with    Fatigue     IN VIA [facility] MEDIC FROM HOME. NEIGHBORS CALLED AFTER PT HAD A FALL OUTSIDE OF HIS TRAILER/HOME. SICK FOV OVER A WEEK. PT'S RIGHT LEG IS A PROSTHETIC. LLE COOL/CLAMMY       Brief History: Patient is a 57 yo male who has not seen physician for years, on no meds at home. He presented to hospital with fatigue, shortness of breath, cough. He had fallen outside his trailer. CT scan chest showed bilateral pneumonia. COVID-19, PCR (10/17) negative. Subjective:  Presently resting in bed. Reports he is feeling better, ready to go home. Says he has a place to go but not forthcoming with information. Case management assisting. Denies shortness of breath, cough, chest pain, chills, abdominal pain, nausea, diarrhea. O2 sats low to mid 90s on room air. Has been seen by [facility] regarding prosthetic. Current prosthetic is functional but will need replaced once insurance improved.      Reviewed interval ancillary notes    Current Medications  mineral oil-hydrophilic petrolatum (AQUAPHOR) ointment, BID PRN  sodium chloride flush 0.9 % injection 10 mL, 2 times per day  sodium chloride flush 0.9 % injection 10 mL, PRN  acetaminophen (TYLENOL) tablet 650 mg, Q6H PRN    Or  acetaminophen (TYLENOL) suppository 650 mg, Q6H PRN  promethazine (PHENERGAN) tablet 12.5 mg, Q6H PRN    Or  ondansetron (ZOFRAN) injection 4 mg, Q6H PRN  enoxaparin (LOVENOX) injection 40 mg, Daily  potassium chloride 10 mEq/100 mL IVPB (Peripheral Line), PRN  magnesium sulfate 2 g in 50 mL IVPB premix, PRN  azithromycin (ZITHROMAX) 500 mg in D5W 250ml Vial Mate, Q24H  cefTRIAXone (ROCEPHIN) 1 g in sterile water 10 mL IV syringe, Q24H        Objective:  /73   Pulse 75   Temp 98 °F (36.7 °C) (Oral)   Resp 16   Ht 6' 3\" (1.905 m)   Wt 222 lb 8 oz (100.9 kg)   SpO2 91%   BMI 27.81 kg/m²     Intake/Output Summary (Last 24 hours) at 10/21/2020 1325  Last data filed at 10/21/2020 0924  Gross per 24 hour   Intake --   Output 400 ml   Net -400 ml      Wt Readings from Last 3 Encounters:   10/20/20 222 lb 8 oz (100.9 kg)       General:  Awake, alert, oriented in NAD  Skin:  Warm and dry. No unusual bruising or rash  Neck:  Supple. No JVD appreciated  Chest:  Normal effort. Clear to auscultation  Cardiovascular:  RRR, normal S1/S2, no murmur/gallop/rub  Abdomen:  Soft, nontender, +bowel sounds  Extremities:  Right BKA, no edema in left  Neurological: No focal deficits  Psychological: Normal mood and affect      Labs and Tests:  CBC:   Recent Labs     10/19/20  1429 10/21/20  0555   WBC 6.6 6.8   HGB 13.5 12.8*    251     BMP:    Recent Labs     10/19/20  1429 10/21/20  0555    138   K 4.0 3.0*    105   CO2 24 27   BUN 13 12   CREATININE 0.8 0.7*   GLUCOSE 183* 102*     Hepatic:   No results for input(s): AST, ALT, ALB, BILITOT, ALKPHOS in the last 72 hours. CXR 10/17/2020:  FINDINGS:    The cardial-pericardial silhouette is unremarkable in appearance. The lungs    are clear. No pneumothorax is found. No free air is seen. No acute bony    abnormality. CT Head/Cervical Spine 10/17/2020:  No acute intracranial abnormality.  Mild cerebral atrophy appropriate for age.         Early degenerative changes of the cervical spine.  No acute traumatic    abnormality. CT Abdomen/Pelvis 10/17/2020:  Bibasilar pulmonary infiltrates.  Correlate for pneumonia.         Thickening and edema of the lower esophagus which may reflect esophagitis.         Probable right adrenal adenoma.         Atrophic left adrenal gland.         Sludge or tiny stones in the gallbladder.       CT Chest 10/17/2020:  No acute posttraumatic abnormality visualized.         Infiltrates within both lung bases which could reflect pneumonia.         Small amount of layering debris within the distal trachea extending into both    central bronchi, likely reflecting pooled secretion.  Recommend chest    physiotherapy.         Mild thickening and edema of the lower esophagus which may reflect    esophagitis.         Pulmonary emphysema. Problem List  Active Problems:    Multifocal pneumonia  Resolved Problems:    * No resolved hospital problems. *       Assessment & Plan:   1. Pneumonia. CT scan showed bilateral infiltrates in bases. COVID-19 PCR and influenza screens negative. Procalcitonin 0.49, WBC 11.8->6.6->6.8. Remains afebrile. Switch IV azithromycin and ceftriaxone to po azithromycin 250 mg daily and Ceftin 500 mg BID. Will give both for 4 days to complete 7 day course antibiotics for CAP. Add lactobacillus. O2 sats stable. 2. Sepsis. Present on admission based on pneumonia, tachypnea (RR 27). , tachycardia (HR 96), lactic acid 2.1->1.4. Received IV fluids. 3. Hypernatremia. Sodium 148 on admission. Resolved with IV hydration, 138 today. 4. Weakness. Secondary to acute illness. Consult PT/OT. 5. Hypokalemia. Potassium 3.0 today, received 2 doses IV potassium 10 mEq each then pulled out IV. Will give potassium 40 mEq po now, add on magnesium level. Recheck potassium level this evening, will repeat dose if still under 3.5. Disposition: Likely DC this evening if potassium level improved. Patient says he has place to go but not willing to tell me where. Case management assisting with DC planning. Prescriptions sent to out-patient pharmacy.        Diet: DIET GENERAL;  Code:Full Code  DVT PPX: enoxaparin      AYESHA Rivera CNP   10/21/2020 1:25 PM

## 2020-10-21 NOTE — PROGRESS NOTES
18 assisted to wheelchir-steady gait. Mask put on. [address] belongings on lap. Taken to outside of [place] area. 21  put belongings in brother in laws trunk. Walked without help to front seat-steady on feet. [phone] discharged to home.

## 2020-10-21 NOTE — PROGRESS NOTES
Verified with outpatient pharmacy, medications will be filled at 100%. Pharmacy to bring medications via meds to beds this evening due to possible discharge pending patient's repeat potassium level.

## 2020-10-21 NOTE — PROGRESS NOTES
[facility]  Prosthetic evaluation per order. Prosthesis is deteriorated and in need of replacement. Obtained contact information from patient. I understand insurance is pending. I will coordinate care with .   Ceasar Smith [phone]

## 2020-10-21 NOTE — PROGRESS NOTES
Data- discharge order received, pt verbalized agreement to discharge, disposition to previous residence, no needs for HHC/DME. Action- discharge instructions prepared and given to patient, pt verbalized understanding. Medication information packet given r/t NEW and/or CHANGED prescriptions emphasizing name/purpose/side effects, pt verbalized understanding. Discharge instruction summary: Diet- general, Activity- as tolerated, Primary Care Physician as follows: Referring Not In System (Inactive) None - PCP list provided to patient to review, immunizations reviewed and updated, prescription medications filled by meds to beds. Inpatient surgical procedure precautions reviewed: n/a. Response- Pt belongings gathered, IV removed. Disposition is home (no HHC/DME needs), transported with brother-in-law, taken to lobby via w/c w/ discharge lounge, no complications.

## 2020-10-21 NOTE — PLAN OF CARE
Problem: Falls - Risk of:  Goal: Will remain free from falls  Description: Will remain free from falls  10/21/2020 0755 by Dania Shelton RN  Outcome: Ongoing  Note: High fall risk per Ferguson scale. Fall precautions in place, bed alarm engaged. Non-skid footwear on patient, belongings within reach, bed in lowest position. Problem: Skin Integrity:  Goal: Absence of new skin breakdown  Description: Absence of new skin breakdown  10/21/2020 0755 by Dania Shelton RN  Outcome: Ongoing  Note: Patient with existing skin breakdown. Wound care RN consulted. Dressing changes Qshift. Problem: Gas Exchange - Impaired:  Goal: Levels of oxygenation will improve  Description: Levels of oxygenation will improve  Outcome: Ongoing  Note: Patient on room air. No need for supplemental oxygen. Spot O2 checks Q4 hours and PRN.

## 2021-03-12 NOTE — PROGRESS NOTES
4 Eyes Skin Assessment     NAME:  Clair Rios  YOB: 1960  MEDICAL RECORD NUMBER:  [id number]    The patient is being assess for  Admission    I agree that 2 RN's have performed a thorough Head to Toe Skin Assessment on the patient. ALL assessment sites listed below have been assessed. Areas assessed by both nurses:    Head, Face, Ears, Shoulders, Back, Chest, Arms, Elbows, Hands, Sacrum. Buttock, Coccyx, Ischium and Legs. Feet and Heels        Does the Patient have a Wound? Yes wound(s) were present on assessment.  LDA wound assessment was Initiated and completed        Kenny Prevention initiated:  Yes   Wound Care Orders initiated:  Yes    Pressure Injury (Stage 3,4, Unstageable, DTI, NWPT, and Complex wounds) if present place consult order under [de-identified] Yes    New and Established Ostomies if present place consult order under : NA      Nurse 1 eSignature: Electronically signed by Jyoti Calabrese RN on 10/18/20 at 5:48 AM EDT    **SHARE this note so that the co-signing nurse is able to place an eSignature**    Nurse 2 eSignature: Electronically signed by Hilda Neal RN on 10/18/20 at 5:52 AM EDT no

## 2023-07-07 ENCOUNTER — HOSPITAL ENCOUNTER (EMERGENCY)
Age: 63
Discharge: HOME | End: 2023-07-07
Attending: INTERNAL MEDICINE
Payer: COMMERCIAL

## 2023-07-07 VITALS
TEMPERATURE: 97.3 F | RESPIRATION RATE: 16 BRPM | BODY MASS INDEX: 27.35 KG/M2 | OXYGEN SATURATION: 96 % | WEIGHT: 220 LBS | HEIGHT: 75 IN | HEART RATE: 80 BPM | SYSTOLIC BLOOD PRESSURE: 123 MMHG | DIASTOLIC BLOOD PRESSURE: 70 MMHG

## 2023-07-07 DIAGNOSIS — Z59.00 HOMELESSNESS: Primary | ICD-10-CM

## 2023-07-07 LAB
ALBUMIN SERPL-MCNC: 3.9 G/DL (ref 3.4–5)
ALP SERPL-CCNC: 91 U/L (ref 40–129)
ALT SERPL-CCNC: 10 U/L (ref 10–40)
AMPHETAMINES UR QL SCN>1000 NG/ML: NORMAL
ANION GAP SERPL CALCULATED.3IONS-SCNC: 9 MMOL/L (ref 3–16)
AST SERPL-CCNC: 16 U/L (ref 15–37)
BACTERIA URNS QL MICRO: ABNORMAL /HPF
BARBITURATES UR QL SCN>200 NG/ML: NORMAL
BASOPHILS # BLD: 0 K/UL (ref 0–0.2)
BASOPHILS NFR BLD: 0.4 %
BENZODIAZ UR QL SCN>200 NG/ML: NORMAL
BILIRUB DIRECT SERPL-MCNC: <0.2 MG/DL (ref 0–0.3)
BILIRUB INDIRECT SERPL-MCNC: NORMAL MG/DL (ref 0–1)
BILIRUB SERPL-MCNC: 0.3 MG/DL (ref 0–1)
BILIRUB UR QL STRIP.AUTO: NEGATIVE
BUN SERPL-MCNC: 11 MG/DL (ref 7–20)
CALCIUM SERPL-MCNC: 9.5 MG/DL (ref 8.3–10.6)
CANNABINOIDS UR QL SCN>50 NG/ML: NORMAL
CHLORIDE SERPL-SCNC: 99 MMOL/L (ref 99–110)
CLARITY UR: CLEAR
CO2 SERPL-SCNC: 30 MMOL/L (ref 21–32)
COCAINE UR QL SCN: NORMAL
COLOR UR: YELLOW
CREAT SERPL-MCNC: 1 MG/DL (ref 0.8–1.3)
DEPRECATED RDW RBC AUTO: 14.6 % (ref 12.4–15.4)
DRUG SCREEN COMMENT UR-IMP: NORMAL
EOSINOPHIL # BLD: 0.1 K/UL (ref 0–0.6)
EOSINOPHIL NFR BLD: 1.8 %
EPI CELLS #/AREA URNS AUTO: 0 /HPF (ref 0–5)
ETHANOLAMINE SERPL-MCNC: NORMAL MG/DL (ref 0–0.08)
FENTANYL SCREEN, URINE: NORMAL
GFR SERPLBLD CREATININE-BSD FMLA CKD-EPI: >60 ML/MIN/{1.73_M2}
GLUCOSE SERPL-MCNC: 102 MG/DL (ref 70–99)
GLUCOSE UR STRIP.AUTO-MCNC: NEGATIVE MG/DL
HCT VFR BLD AUTO: 38 % (ref 40.5–52.5)
HGB BLD-MCNC: 12.5 G/DL (ref 13.5–17.5)
HGB UR QL STRIP.AUTO: NEGATIVE
HYALINE CASTS #/AREA URNS AUTO: 0 /LPF (ref 0–8)
KETONES UR STRIP.AUTO-MCNC: ABNORMAL MG/DL
LEUKOCYTE ESTERASE UR QL STRIP.AUTO: ABNORMAL
LYMPHOCYTES # BLD: 1 K/UL (ref 1–5.1)
LYMPHOCYTES NFR BLD: 13.5 %
MCH RBC QN AUTO: 30.3 PG (ref 26–34)
MCHC RBC AUTO-ENTMCNC: 32.9 G/DL (ref 31–36)
MCV RBC AUTO: 92 FL (ref 80–100)
METHADONE UR QL SCN>300 NG/ML: NORMAL
MONOCYTES # BLD: 0.7 K/UL (ref 0–1.3)
MONOCYTES NFR BLD: 9.6 %
NEUTROPHILS # BLD: 5.3 K/UL (ref 1.7–7.7)
NEUTROPHILS NFR BLD: 74.7 %
NITRITE UR QL STRIP.AUTO: NEGATIVE
OPIATES UR QL SCN>300 NG/ML: NORMAL
OXYCODONE UR QL SCN: NORMAL
PCP UR QL SCN>25 NG/ML: NORMAL
PH UR STRIP.AUTO: 7.5 [PH] (ref 5–8)
PH UR STRIP: 7.5 [PH]
PLATELET # BLD AUTO: 242 K/UL (ref 135–450)
PMV BLD AUTO: 9 FL (ref 5–10.5)
POTASSIUM SERPL-SCNC: 3.7 MMOL/L (ref 3.5–5.1)
PROT SERPL-MCNC: 7.3 G/DL (ref 6.4–8.2)
PROT UR STRIP.AUTO-MCNC: 30 MG/DL
RBC # BLD AUTO: 4.13 M/UL (ref 4.2–5.9)
RBC CLUMPS #/AREA URNS AUTO: 1 /HPF (ref 0–4)
SARS-COV-2 RDRP RESP QL NAA+PROBE: NOT DETECTED
SODIUM SERPL-SCNC: 138 MMOL/L (ref 136–145)
SP GR UR STRIP.AUTO: 1.02 (ref 1–1.03)
UA COMPLETE W REFLEX CULTURE PNL UR: YES
UA DIPSTICK W REFLEX MICRO PNL UR: YES
URN SPEC COLLECT METH UR: ABNORMAL
UROBILINOGEN UR STRIP-ACNC: 1 E.U./DL
WBC # BLD AUTO: 7.1 K/UL (ref 4–11)
WBC #/AREA URNS AUTO: 21 /HPF (ref 0–5)

## 2023-07-07 PROCEDURE — 80048 BASIC METABOLIC PNL TOTAL CA: CPT

## 2023-07-07 PROCEDURE — 90792 PSYCH DIAG EVAL W/MED SRVCS: CPT

## 2023-07-07 PROCEDURE — 87086 URINE CULTURE/COLONY COUNT: CPT

## 2023-07-07 PROCEDURE — 82077 ASSAY SPEC XCP UR&BREATH IA: CPT

## 2023-07-07 PROCEDURE — 85025 COMPLETE CBC W/AUTO DIFF WBC: CPT

## 2023-07-07 PROCEDURE — 99283 EMERGENCY DEPT VISIT LOW MDM: CPT

## 2023-07-07 PROCEDURE — 81001 URINALYSIS AUTO W/SCOPE: CPT

## 2023-07-07 PROCEDURE — 80076 HEPATIC FUNCTION PANEL: CPT

## 2023-07-07 PROCEDURE — 80307 DRUG TEST PRSMV CHEM ANLYZR: CPT

## 2023-07-07 PROCEDURE — 87635 SARS-COV-2 COVID-19 AMP PRB: CPT

## 2023-07-07 SDOH — ECONOMIC STABILITY - HOUSING INSECURITY: HOMELESSNESS UNSPECIFIED: Z59.00

## 2023-07-07 ASSESSMENT — SLEEP AND FATIGUE QUESTIONNAIRES
DO YOU HAVE DIFFICULTY SLEEPING: YES
AVERAGE NUMBER OF SLEEP HOURS: -1
DO YOU USE A SLEEP AID: NO
SLEEP PATTERN: DIFFICULTY FALLING ASLEEP;RESTLESSNESS

## 2023-07-07 ASSESSMENT — PAIN - FUNCTIONAL ASSESSMENT: PAIN_FUNCTIONAL_ASSESSMENT: NONE - DENIES PAIN

## 2023-07-07 NOTE — ED NOTES
Telepsych conversing with pt via computer.  in room speaking with telepsych worker.      Prem Monsalve RN  07/07/23 5091
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
## 2023-07-07 NOTE — BH NOTE
who called [facility] no residents accepted after 3pm or on the weekend . She was given a referral  to [facility] [phone]. This writer phoned [facility] at [phone]. [facility] states he had just spoken with a SW at the [facility] who gave him 3-4 referrals for shelters and asked that the Ed give him 30 minutes to find a place he would pick pt up. ED and family made aware of plan of care to discharge. [facility] reports \" If he doesn't want to go, I have done all I can do\".    Graeme Rabago [employer]

## 2023-07-07 NOTE — ED NOTES
Pt placed in a gown with strings removed, all belongings removed, inventoried and given to security, ED-5  Room is safe for patient, all monitor wires removed, all other cords removed except call light. Call light given to patient . Door remains open for safety of patient and .  Yoni Bowers from [facility] at bedside, attempting to call the [employer] crisis hotline to assist in helping pt with his needs.   Sitter at bedside     Betti Hammans, [place]  07/07/23 6254

## 2023-07-07 NOTE — ED NOTES
Pt noted to have urinated on self when attempting to urinate in urinal.  This RN offers pt's sheets to be changed. Pt declines. Pt provided with turkey sandwich x2 and pepsi.      Fatuma Lenz RN  07/07/23 3188

## 2023-07-08 LAB — BACTERIA UR CULT: NORMAL

## 2023-10-04 NOTE — PROGRESS NOTES
Reassessment completed,no significant change observed  patient denies needs at this time, call light in reach, will continue to monitor. No